# Patient Record
Sex: MALE | URBAN - METROPOLITAN AREA
[De-identification: names, ages, dates, MRNs, and addresses within clinical notes are randomized per-mention and may not be internally consistent; named-entity substitution may affect disease eponyms.]

---

## 2017-10-06 NOTE — PATIENT DISCUSSION
(U76.627) Other secondary cataract, left eye - Assesment : Significant posterior capsule opacification present. - Plan : YAG OS

## 2017-10-06 NOTE — PATIENT DISCUSSION
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 No hemorrhages or signs of diabetic macular edema seen in either eye today. - Plan : Daily blood sugar control. Keep A1C level below 6.5. Call with decreased vision, increased floaters or flashes. Follow up with PCP as scheduled. 1 year exam with Dr. Diana Donato.

## 2017-10-06 NOTE — PATIENT DISCUSSION
(H35.361) Drusen (degenerative) of macula, right eye - Assesment : Examination revealed Drusen. -FINE HARD - Plan : Monitor for changes. Advised patient to call our office with decreased vision or increased symptoms.

## 2017-10-06 NOTE — PATIENT DISCUSSION
(W02.167) Vitreous degeneration, left eye - Assesment : Examination revealed PVD - Plan : Monitor for changes. Advised patient to call our office with decreased vision or an increase in flashes and/or floaters.

## 2017-10-06 NOTE — PATIENT DISCUSSION
(H35.373) Puckering of macula, bilateral - Assesment : Examination revealed ERM. Stable OU - Plan : Monitor. Advised patient to call our office with decreased vision or increased symptoms.

## 2020-11-03 ENCOUNTER — IMPORTED ENCOUNTER (OUTPATIENT)
Dept: URBAN - METROPOLITAN AREA CLINIC 9 | Facility: CLINIC | Age: 60
End: 2020-11-03

## 2021-10-16 ASSESSMENT — VISUAL ACUITY
OS_CC: 20/30 + SN
OD_CC: 20/25 - SN

## 2022-07-02 RX ORDER — ALBUTEROL SULFATE 90 UG/1
AEROSOL, METERED RESPIRATORY (INHALATION)
COMMUNITY

## 2024-05-31 ENCOUNTER — EMERGENCY VISIT (OUTPATIENT)
Facility: LOCATION | Age: 64
End: 2024-05-31

## 2024-05-31 DIAGNOSIS — H11.052: ICD-10-CM

## 2024-05-31 DIAGNOSIS — H04.123: ICD-10-CM

## 2024-05-31 PROCEDURE — 99203 OFFICE O/P NEW LOW 30 MIN: CPT

## 2024-05-31 ASSESSMENT — VISUAL ACUITY
OS_SC: 20/50
OD_SC: 20/40-2
OU_SC: 20/40-2

## 2024-05-31 ASSESSMENT — TONOMETRY
OS_IOP_MMHG: 14
OD_IOP_MMHG: 13